# Patient Record
Sex: MALE | Race: WHITE
[De-identification: names, ages, dates, MRNs, and addresses within clinical notes are randomized per-mention and may not be internally consistent; named-entity substitution may affect disease eponyms.]

---

## 2017-12-20 ENCOUNTER — HOSPITAL ENCOUNTER (OUTPATIENT)
Dept: HOSPITAL 84 - D.OPS | Age: 72
Discharge: HOME | End: 2017-12-20
Attending: SURGERY
Payer: MEDICARE

## 2017-12-20 VITALS — HEIGHT: 65 IN | BODY MASS INDEX: 29.16 KG/M2 | WEIGHT: 175 LBS

## 2017-12-20 DIAGNOSIS — Z01.812: ICD-10-CM

## 2017-12-20 DIAGNOSIS — Z86.010: Primary | ICD-10-CM

## 2017-12-20 DIAGNOSIS — E11.9: ICD-10-CM

## 2017-12-20 DIAGNOSIS — I10: ICD-10-CM

## 2017-12-20 DIAGNOSIS — K63.5: ICD-10-CM

## 2017-12-20 DIAGNOSIS — Z95.5: ICD-10-CM

## 2017-12-20 DIAGNOSIS — I50.9: ICD-10-CM

## 2017-12-20 DIAGNOSIS — J44.9: ICD-10-CM

## 2017-12-20 DIAGNOSIS — Z87.891: ICD-10-CM

## 2017-12-20 LAB
ANION GAP SERPL CALC-SCNC: 13.2 MMOL/L (ref 8–16)
BUN SERPL-MCNC: 15 MG/DL (ref 7–18)
CALCIUM SERPL-MCNC: 9.5 MG/DL (ref 8.5–10.1)
CHLORIDE SERPL-SCNC: 103 MMOL/L (ref 98–107)
CO2 SERPL-SCNC: 26.2 MMOL/L (ref 21–32)
CREAT SERPL-MCNC: 1.3 MG/DL (ref 0.6–1.3)
ERYTHROCYTE [DISTWIDTH] IN BLOOD BY AUTOMATED COUNT: 12.9 % (ref 11.5–14.5)
GLUCOSE SERPL-MCNC: 153 MG/DL (ref 74–106)
HCT VFR BLD CALC: 41.8 % (ref 42–54)
HGB BLD-MCNC: 14.7 G/DL (ref 13.5–17.5)
MCH RBC QN AUTO: 31.5 PG (ref 26–34)
MCHC RBC AUTO-ENTMCNC: 35.2 G/DL (ref 31–37)
MCV RBC: 89.5 FL (ref 80–100)
OSMOLALITY SERPL CALC.SUM OF ELEC: 279 MOSM/KG (ref 275–300)
PLATELET # BLD: 233 10X3/UL (ref 130–400)
PMV BLD AUTO: 9.2 FL (ref 7.4–10.4)
POTASSIUM SERPL-SCNC: 4.4 MMOL/L (ref 3.5–5.1)
RBC # BLD AUTO: 4.67 10X6/UL (ref 4.2–6.1)
SODIUM SERPL-SCNC: 138 MMOL/L (ref 136–145)
WBC # BLD AUTO: 8.3 10X3/UL (ref 4.8–10.8)

## 2017-12-20 NOTE — NUR
1530--IV DC'D, PT UP TO DRESS AT THIS TIME. DADA HUDSON
 
0517--DISCHARGE INSTRUCTIONS GIVEN, PT VERBALIZES UNDERSTANDING. PT
OFF UNIT VIA WC. DAAD HUDSON

## 2017-12-28 NOTE — OP
PATIENT NAME:  DC CARROLL                              MEDICAL RECORD: [id number]
:45                                             LOCATION:D.OPS          
                                                         ADMISSION DATE:        
SURGEON:  KRUNAL DONNELLY MD            
 
 
DATE OF OPERATION:  2017
 
PREOPERATIVE DIAGNOSES:
1.  History of complex colon polyps including one polyp at 60 cm.
2.  History of post-polypectomy bleeding.
3.  History of right-sided prostatic nodule.
 
POSTOPERATIVE DIAGNOSES:  History of complex colon polyps including one polyp at
60 cm and history of post-polypectomy bleeding, with no evidence of recurrence
or persistence of the polyp at 60 cm.  Two new polyps, which appear to be
adenomatous, they ranged in size from 9 mm to 1.5 cm and were sessile polyps. 
History of right-sided prostatic nodule.
 
PROCEDURES:
1.  Total colonoscopy to cecum.
2.  Hot biopsy forceps polypectomies times 2.
3.  Biopsies of the scar at 60 cm and then treatment of this site with the argon
plasma coagulator.
 
SURGEON:  Krunal Donnelly MD
 
ASSISTANT:  None.
 
BLOOD LOSS:  Minimal.
 
ANESTHESIA:  General.
 
COMPLICATIONS:  None.
 
The risks, possible complications and alternatives of the procedure were
explained to the patient.  He elects to proceed.
 
OPERATIVE COURSE:  The patient was conveyed to the operating room electively on
2017.  General anesthesia was induced by the anesthesia staff.  The
patient was placed in the [place] position.  A colonoscope was inserted through the
anus.  It was easily advanced to the cecum.  Upon withdrawal, I irrigated and
aspirated extensively.  The prep was excellent.  Two sessile polyps were noted
and were removed in their entireties utilizing the hot biopsy forceps
polypectomy technique.  The scar was noted.  It was biopsied with the cold
endoscopic biopsy forceps and then the base was obliterated with the argon
plasma coagulator.
 
I then continued to withdraw the endoscope.  I dragged the folds.  A combination
of direct imaging as well as narrow band imaging were utilized.  A retroflexed
view was obtained in the rectum.  I noted no anal polyps or lower rectal polyps.
 I then unretroflexed the scope and removed it under direct vision.
 
I will see the patient back in my office in 2-3 weeks.  It is very likely I will
return the patient's endoscopic needs back over to Dr. Chopra.
 
TRANSINT:[id number] Voice Confirmation ID: [id number] DOCUMENT ID: [id number]
CC:  Dr. S. Vengala
 
 
 
OPERATIVE REPORT                               [id number]    DC CARROLL ROBERT MD            
 
 
 
Electronically Signed by KRUNAL DONNELLY on 17 at 1327
 
 
 
 
 
 
 
 
 
 
 
 
 
 
 
 
 
 
 
 
 
 
 
 
 
 
 
 
 
 
 
 
 
 
 
 
 
 
 
 
 
CC: ZAKIA CHOPRA MD, STEVEN GUZMAN MD and GEMINI SHEN [id number]
DICTATION DATE: 17 1415     :     17 1635      [facility] [place] 
                                                                      [address]
[place] [id number] [place] [id number]

## 2017-12-28 NOTE — HP
PATIENT: DC CARROLL                                    MEDICAL RECORD: [id number]
ACCOUNT: [id number]                                    LOCATION:D.OPS         
: 45                                            ADMISSION DATE: 17
                                                         
 
                             HISTORY AND PHYSICAL EXAMINATION
 
 
CHIEF COMPLAINT:  History of colon polyps.
 
HISTORY OF PRESENT ILLNESS:  The patient has a history of complex colon polyps. 
He had a complex colon polyp, which was a 2.4-cm polyp that was 60 cm from the
anus.  This was an adenomatous polyp, which was a tubular adenoma.  He also has
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 
The patient had to be readmitted after his last colonoscopy due to hematochezia.
 
The patient had been restarted on Effient 2 days post-procedurally.  I think I
will keep him off the Effient for a little while longer this time.
 
REVIEW OF SYSTEMS:  No chest pain.  No cough.  No nausea or vomiting.
 
PAST MEDICAL AND SURGICAL HISTORY:  Hypertension, gastroesophageal reflux,
non-insulin-dependent diabetes mellitus, coronary stents. 
 
HOME MEDICINES:  Last list of home medicines I have for the patient includes
metoprolol, Effient, metformin, lisinopril, as well as amlodipine.
 
ALLERGIES:  No known drug allergies.
 
SOCIAL HISTORY:  Does not drink.  Does not smoke.
 
PHYSICAL EXAMINATION:
GENERAL:  The patient does not appear acutely ill.  He does not appear
chronically ill.
VITAL SIGNS:  Reviewed.
EARS:  External ears appear normal.
EYES:  Extraocular movements are intact.
NECK:  Trachea is midline.
CHEST:  No intercostal retractions.
PULMONARY:  Nonlabored.  No stridor.
ABDOMEN:  Nontender.
 
IMPRESSION:  History of complex colon polyps.
 
PLAN:  Colonoscopy and polypectomy with argon plasma coagulator.
 
TRANSINT:[id number] Voice Confirmation ID: [id number] DOCUMENT ID: [id number]
CC:  Dr. CHLOE Salazar
 
 
 
 
 
HISTORY AND PHYSICAL                           [id number]    DC CARROLL            
 
 
                                           
                                           KRUNAL DONNELLY MD            
 
 
 
Electronically Signed by KRUNAL DONNELLY on 17 at 1327
 
 
 
 
 
 
 
 
 
 
 
 
 
 
 
 
 
 
 
 
 
 
 
 
 
 
 
 
 
 
 
 
 
 
 
 
 
 
 
 
 
CC: ZAKIA STREET MD, STEVEN GUZMAN MD and GEMINI SHEN [id number]
DICTATION DATE: 17 1403     :     17 1423      [facility] 
                                                                      [address]
[place] [address] [place] [address]

## 2018-01-03 ENCOUNTER — HOSPITAL ENCOUNTER (OUTPATIENT)
Dept: HOSPITAL 84 - D.ER | Age: 73
Setting detail: OBSERVATION
LOS: 2 days | Discharge: HOME | End: 2018-01-05
Attending: SURGERY | Admitting: SURGERY
Payer: MEDICARE

## 2018-01-03 VITALS
BODY MASS INDEX: 28.32 KG/M2 | HEIGHT: 65 IN | HEIGHT: 65 IN | WEIGHT: 170 LBS | BODY MASS INDEX: 28.32 KG/M2 | WEIGHT: 170 LBS | BODY MASS INDEX: 28.32 KG/M2

## 2018-01-03 DIAGNOSIS — E11.9: ICD-10-CM

## 2018-01-03 DIAGNOSIS — J43.9: ICD-10-CM

## 2018-01-03 DIAGNOSIS — K91.840: Primary | ICD-10-CM

## 2018-01-03 DIAGNOSIS — Z95.5: ICD-10-CM

## 2018-01-03 DIAGNOSIS — D62: ICD-10-CM

## 2018-01-03 DIAGNOSIS — I10: ICD-10-CM

## 2018-01-03 LAB
ALBUMIN SERPL-MCNC: 3.5 G/DL (ref 3.4–5)
ALP SERPL-CCNC: 62 U/L (ref 46–116)
ALT SERPL-CCNC: 32 U/L (ref 10–68)
ANION GAP SERPL CALC-SCNC: 13.8 MMOL/L (ref 8–16)
APTT BLD: 28.6 SECONDS (ref 22.8–39.4)
BASOPHILS NFR BLD AUTO: 0.3 % (ref 0–2)
BASOPHILS NFR BLD AUTO: 0.3 % (ref 0–2)
BILIRUB SERPL-MCNC: 0.37 MG/DL (ref 0.2–1.3)
BUN SERPL-MCNC: 13 MG/DL (ref 7–18)
CALCIUM SERPL-MCNC: 8.9 MG/DL (ref 8.5–10.1)
CHLORIDE SERPL-SCNC: 104 MMOL/L (ref 98–107)
CO2 SERPL-SCNC: 26.1 MMOL/L (ref 21–32)
CREAT SERPL-MCNC: 1.1 MG/DL (ref 0.6–1.3)
EOSINOPHIL NFR BLD: 1.6 % (ref 0–7)
EOSINOPHIL NFR BLD: 4.6 % (ref 0–7)
ERYTHROCYTE [DISTWIDTH] IN BLOOD BY AUTOMATED COUNT: 12.6 % (ref 11.5–14.5)
ERYTHROCYTE [DISTWIDTH] IN BLOOD BY AUTOMATED COUNT: 12.7 % (ref 11.5–14.5)
GLOBULIN SER-MCNC: 3.5 G/L
GLUCOSE SERPL-MCNC: 138 MG/DL (ref 74–106)
HCT VFR BLD CALC: 26.9 % (ref 42–54)
HCT VFR BLD CALC: 37.6 % (ref 42–54)
HGB BLD-MCNC: 13.1 G/DL (ref 13.5–17.5)
HGB BLD-MCNC: 9.3 G/DL (ref 13.5–17.5)
IMM GRANULOCYTES NFR BLD: 0.1 % (ref 0–5)
IMM GRANULOCYTES NFR BLD: 0.2 % (ref 0–5)
INR PPP: 0.99 (ref 0.85–1.17)
LYMPHOCYTES NFR BLD AUTO: 27.5 % (ref 15–50)
LYMPHOCYTES NFR BLD AUTO: 30 % (ref 15–50)
MCH RBC QN AUTO: 30.9 PG (ref 26–34)
MCH RBC QN AUTO: 31.2 PG (ref 26–34)
MCHC RBC AUTO-ENTMCNC: 34.6 G/DL (ref 31–37)
MCHC RBC AUTO-ENTMCNC: 34.8 G/DL (ref 31–37)
MCV RBC: 89.4 FL (ref 80–100)
MCV RBC: 89.5 FL (ref 80–100)
MONOCYTES NFR BLD: 4.6 % (ref 2–11)
MONOCYTES NFR BLD: 5.4 % (ref 2–11)
NEUTROPHILS NFR BLD AUTO: 59.5 % (ref 40–80)
NEUTROPHILS NFR BLD AUTO: 65.9 % (ref 40–80)
OSMOLALITY SERPL CALC.SUM OF ELEC: 279 MOSM/KG (ref 275–300)
PLATELET # BLD: 252 10X3/UL (ref 130–400)
PLATELET # BLD: 295 10X3/UL (ref 130–400)
PMV BLD AUTO: 9.3 FL (ref 7.4–10.4)
PMV BLD AUTO: 9.6 FL (ref 7.4–10.4)
POTASSIUM SERPL-SCNC: 4.9 MMOL/L (ref 3.5–5.1)
PROT SERPL-MCNC: 7 G/DL (ref 6.4–8.2)
PROTHROMBIN TIME: 12.7 SECONDS (ref 11.6–15)
RBC # BLD AUTO: 3.01 10X6/UL (ref 4.2–6.1)
RBC # BLD AUTO: 4.2 10X6/UL (ref 4.2–6.1)
SODIUM SERPL-SCNC: 139 MMOL/L (ref 136–145)
WBC # BLD AUTO: 10 10X3/UL (ref 4.8–10.8)
WBC # BLD AUTO: 9 10X3/UL (ref 4.8–10.8)

## 2018-01-04 VITALS — DIASTOLIC BLOOD PRESSURE: 48 MMHG | SYSTOLIC BLOOD PRESSURE: 115 MMHG

## 2018-01-04 VITALS — DIASTOLIC BLOOD PRESSURE: 56 MMHG | SYSTOLIC BLOOD PRESSURE: 123 MMHG

## 2018-01-04 VITALS — SYSTOLIC BLOOD PRESSURE: 154 MMHG | DIASTOLIC BLOOD PRESSURE: 62 MMHG

## 2018-01-04 VITALS — DIASTOLIC BLOOD PRESSURE: 42 MMHG | SYSTOLIC BLOOD PRESSURE: 109 MMHG

## 2018-01-04 VITALS — DIASTOLIC BLOOD PRESSURE: 54 MMHG | SYSTOLIC BLOOD PRESSURE: 156 MMHG

## 2018-01-04 LAB
BASOPHILS NFR BLD AUTO: 0.3 % (ref 0–2)
EOSINOPHIL NFR BLD: 1.4 % (ref 0–7)
ERYTHROCYTE [DISTWIDTH] IN BLOOD BY AUTOMATED COUNT: 13.4 % (ref 11.5–14.5)
HCT VFR BLD CALC: 25.1 % (ref 42–54)
HCT VFR BLD CALC: 25.2 % (ref 42–54)
HCT VFR BLD CALC: 26 % (ref 42–54)
HCT VFR BLD CALC: 30.6 % (ref 42–54)
HGB BLD-MCNC: 10.7 G/DL (ref 13.5–17.5)
HGB BLD-MCNC: 8.7 G/DL (ref 13.5–17.5)
HGB BLD-MCNC: 8.7 G/DL (ref 13.5–17.5)
HGB BLD-MCNC: 9 G/DL (ref 13.5–17.5)
IMM GRANULOCYTES NFR BLD: 0.2 % (ref 0–5)
LYMPHOCYTES NFR BLD AUTO: 30.5 % (ref 15–50)
MCH RBC QN AUTO: 31.3 PG (ref 26–34)
MCHC RBC AUTO-ENTMCNC: 35 G/DL (ref 31–37)
MCV RBC: 89.5 FL (ref 80–100)
MONOCYTES NFR BLD: 6.8 % (ref 2–11)
NEUTROPHILS NFR BLD AUTO: 60.8 % (ref 40–80)
PLATELET # BLD: 177 10X3/UL (ref 130–400)
PMV BLD AUTO: 9.5 FL (ref 7.4–10.4)
RBC # BLD AUTO: 3.42 10X6/UL (ref 4.2–6.1)
WBC # BLD AUTO: 11.3 10X3/UL (ref 4.8–10.8)

## 2018-01-05 VITALS — SYSTOLIC BLOOD PRESSURE: 115 MMHG | DIASTOLIC BLOOD PRESSURE: 52 MMHG

## 2018-01-05 VITALS — SYSTOLIC BLOOD PRESSURE: 122 MMHG | DIASTOLIC BLOOD PRESSURE: 52 MMHG

## 2018-01-05 VITALS — DIASTOLIC BLOOD PRESSURE: 49 MMHG | SYSTOLIC BLOOD PRESSURE: 115 MMHG

## 2018-01-05 LAB
HCT VFR BLD CALC: 25.3 % (ref 42–54)
HCT VFR BLD CALC: 25.5 % (ref 42–54)
HCT VFR BLD CALC: 26.8 % (ref 42–54)
HGB BLD-MCNC: 8.8 G/DL (ref 13.5–17.5)
HGB BLD-MCNC: 9 G/DL (ref 13.5–17.5)
HGB BLD-MCNC: 9.3 G/DL (ref 13.5–17.5)